# Patient Record
Sex: FEMALE | Race: BLACK OR AFRICAN AMERICAN | NOT HISPANIC OR LATINO | URBAN - METROPOLITAN AREA
[De-identification: names, ages, dates, MRNs, and addresses within clinical notes are randomized per-mention and may not be internally consistent; named-entity substitution may affect disease eponyms.]

---

## 2024-10-02 ENCOUNTER — OFFICE VISIT (OUTPATIENT)
Dept: URGENT CARE | Age: 64
End: 2024-10-02

## 2024-10-02 VITALS
DIASTOLIC BLOOD PRESSURE: 74 MMHG | RESPIRATION RATE: 16 BRPM | TEMPERATURE: 97.7 F | SYSTOLIC BLOOD PRESSURE: 125 MMHG | HEART RATE: 67 BPM | WEIGHT: 142.6 LBS | OXYGEN SATURATION: 99 %

## 2024-10-02 DIAGNOSIS — S29.012A STRAIN OF MUSCLE AND TENDON OF BACK WALL OF THORAX, INITIAL ENCOUNTER: Primary | ICD-10-CM

## 2024-10-02 DIAGNOSIS — R31.9 HEMATURIA, UNSPECIFIED TYPE: ICD-10-CM

## 2024-10-02 LAB
POC BILIRUBIN, URINE: NEGATIVE
POC BLOOD, URINE: ABNORMAL
POC GLUCOSE, URINE: NEGATIVE MG/DL
POC KETONES, URINE: NEGATIVE MG/DL
POC LEUKOCYTES, URINE: NEGATIVE
POC NITRITE,URINE: NEGATIVE
POC PH, URINE: 6 PH
POC PROTEIN, URINE: NEGATIVE MG/DL
POC SPECIFIC GRAVITY, URINE: 1.01
POC UROBILINOGEN, URINE: 0.2 EU/DL

## 2024-10-02 ASSESSMENT — PAIN SCALES - GENERAL: PAINLEVEL: 4

## 2024-10-02 ASSESSMENT — ENCOUNTER SYMPTOMS: BACK PAIN: 1

## 2024-10-02 NOTE — PROGRESS NOTES
Subjective   Patient ID: Umair Malik is a 64 y.o. female. They present today with a chief complaint of Back Pain (Back pain).    History of Present Illness  Reports constant aching pain to the left thoracic region of her back x2 days. Noted her urine was darker than normal, so drank more fluids with some improvement. Reports h/o kidney stones with asymptomatic passage, and notes she has chronic microscopic hematuria. States she has been moving a lot of her belongings back to the area over the past few days, but denies direct trauma. Denies abdominal pain, N/V, fever, rash, numbness, tingling.      Back Pain        Past Medical History  Allergies as of 10/02/2024 - Reviewed 10/02/2024   Allergen Reaction Noted    Latex Unknown 08/06/2018    Tetracycline Hives 09/15/2009    Amoxicillin Diarrhea, Hives, and Photosensitivity 12/03/2021       (Not in a hospital admission)       No past medical history on file.    No past surgical history on file.     reports that she has never smoked. She has never used smokeless tobacco.    Review of Systems  Pertinent systems reviewed and were negative unless otherwise stated in HPI.    Objective    Vitals:    10/02/24 1324   BP: 125/74   BP Location: Left arm   Patient Position: Sitting   BP Cuff Size: Adult   Pulse: 67   Resp: 16   Temp: 36.5 °C (97.7 °F)   TempSrc: Oral   SpO2: 99%   Weight: 64.7 kg (142 lb 9.6 oz)     No LMP recorded.    Physical Exam  Constitutional:       General: She is not in acute distress.  Cardiovascular:      Rate and Rhythm: Normal rate and regular rhythm.      Heart sounds: Normal heart sounds.   Pulmonary:      Effort: Pulmonary effort is normal.      Breath sounds: Normal breath sounds.   Abdominal:      Palpations: Abdomen is soft.      Tenderness: There is no abdominal tenderness.   Musculoskeletal:      Cervical back: Normal.      Thoracic back: Tenderness (left) present. No deformity, spasms or bony tenderness. Normal range of motion.      Lumbar  back: Normal.         Diagnostic study results (if any) were reviewed by Galileo Mendez PA-C.    Assessment/Plan   Allergies, medications, history, and pertinent labs/EKGs/imaging reviewed by Galileo Mendez PA-C.     Medical Decision Making  Nephrolithiasis vs muscular strain. Low concern for infected stone, pyelonephritis, shingles, acute fx or disocation. Advised plenty of fluids, Tylenol for pain, lidocaine patches. ED if uncontrolled or worsening.    Orders and Diagnoses  Diagnoses and all orders for this visit:  Strain of muscle and tendon of back wall of thorax, initial encounter  Hematuria, unspecified type  -     POCT UA Automated manually resulted      Medical Admin Record      Disposition: Home    Electronically signed by Galileo Mendez PA-C